# Patient Record
Sex: FEMALE | Race: WHITE | Employment: FULL TIME
[De-identification: names, ages, dates, MRNs, and addresses within clinical notes are randomized per-mention and may not be internally consistent; named-entity substitution may affect disease eponyms.]

---

## 2023-11-19 ENCOUNTER — APPOINTMENT (OUTPATIENT)
Facility: HOSPITAL | Age: 53
DRG: 286 | End: 2023-11-19
Payer: COMMERCIAL

## 2023-11-19 ENCOUNTER — HOSPITAL ENCOUNTER (INPATIENT)
Facility: HOSPITAL | Age: 53
LOS: 1 days | Discharge: HOME | DRG: 286 | End: 2023-11-20
Attending: EMERGENCY MEDICINE | Admitting: INTERNAL MEDICINE
Payer: COMMERCIAL

## 2023-11-19 DIAGNOSIS — I21.3 ST ELEVATION MYOCARDIAL INFARCTION (STEMI), UNSPECIFIED ARTERY (HCC): Primary | ICD-10-CM

## 2023-11-19 DIAGNOSIS — I21.3 STEMI (ST ELEVATION MYOCARDIAL INFARCTION) (HCC): ICD-10-CM

## 2023-11-19 PROBLEM — F31.81 BIPOLAR 2 DISORDER (HCC): Status: ACTIVE | Noted: 2023-11-19

## 2023-11-19 PROBLEM — R07.9 CHEST PAIN: Status: ACTIVE | Noted: 2023-11-19

## 2023-11-19 PROBLEM — E87.6 HYPOKALEMIA: Status: ACTIVE | Noted: 2023-11-19

## 2023-11-19 LAB
ALBUMIN SERPL-MCNC: 3.9 G/DL (ref 3.5–5)
ALBUMIN/GLOB SERPL: 1 (ref 1.1–2.2)
ALP SERPL-CCNC: 72 U/L (ref 45–117)
ALT SERPL-CCNC: 37 U/L (ref 12–78)
AMPHET UR QL SCN: NEGATIVE
ANION GAP SERPL CALC-SCNC: 10 MMOL/L (ref 5–15)
APPEARANCE UR: CLEAR
APTT PPP: 26.4 SEC (ref 22.1–31)
AST SERPL-CCNC: 19 U/L (ref 15–37)
B PERT DNA SPEC QL NAA+PROBE: NOT DETECTED
BARBITURATES UR QL SCN: NEGATIVE
BENZODIAZ UR QL: POSITIVE
BILIRUB SERPL-MCNC: 0.5 MG/DL (ref 0.2–1)
BILIRUB UR QL: NEGATIVE
BORDETELLA PARAPERTUSSIS BY PCR: NOT DETECTED
BUN SERPL-MCNC: 11 MG/DL (ref 6–20)
BUN/CREAT SERPL: 11 (ref 12–20)
C PNEUM DNA SPEC QL NAA+PROBE: NOT DETECTED
CALCIUM SERPL-MCNC: 9.8 MG/DL (ref 8.5–10.1)
CANNABINOIDS UR QL SCN: NEGATIVE
CHLORIDE SERPL-SCNC: 104 MMOL/L (ref 97–108)
CHOLEST SERPL-MCNC: 244 MG/DL
CO2 SERPL-SCNC: 23 MMOL/L (ref 21–32)
COCAINE UR QL SCN: NEGATIVE
COLOR UR: NORMAL
COMMENT:: NORMAL
CREAT SERPL-MCNC: 1.02 MG/DL (ref 0.55–1.02)
D DIMER PPP FEU-MCNC: 0.38 MG/L FEU (ref 0–0.65)
ECHO BSA: 1.75 M2
EST. AVERAGE GLUCOSE BLD GHB EST-MCNC: 100 MG/DL
ETHANOL SERPL-MCNC: <10 MG/DL (ref 0–0.08)
FLUAV SUBTYP SPEC NAA+PROBE: NOT DETECTED
FLUBV RNA SPEC QL NAA+PROBE: NOT DETECTED
GLOBULIN SER CALC-MCNC: 3.9 G/DL (ref 2–4)
GLUCOSE SERPL-MCNC: 181 MG/DL (ref 65–100)
GLUCOSE UR STRIP.AUTO-MCNC: NEGATIVE MG/DL
HADV DNA SPEC QL NAA+PROBE: NOT DETECTED
HBA1C MFR BLD: 5.1 % (ref 4–5.6)
HCOV 229E RNA SPEC QL NAA+PROBE: NOT DETECTED
HCOV HKU1 RNA SPEC QL NAA+PROBE: NOT DETECTED
HCOV NL63 RNA SPEC QL NAA+PROBE: NOT DETECTED
HCOV OC43 RNA SPEC QL NAA+PROBE: NOT DETECTED
HDLC SERPL-MCNC: 76 MG/DL
HDLC SERPL: 3.2 (ref 0–5)
HGB UR QL STRIP: NEGATIVE
HMPV RNA SPEC QL NAA+PROBE: NOT DETECTED
HPIV1 RNA SPEC QL NAA+PROBE: NOT DETECTED
HPIV2 RNA SPEC QL NAA+PROBE: NOT DETECTED
HPIV3 RNA SPEC QL NAA+PROBE: NOT DETECTED
HPIV4 RNA SPEC QL NAA+PROBE: NOT DETECTED
INR PPP: 1 (ref 0.9–1.1)
KETONES UR QL STRIP.AUTO: NEGATIVE MG/DL
LDLC SERPL CALC-MCNC: 158.2 MG/DL (ref 0–100)
LEUKOCYTE ESTERASE UR QL STRIP.AUTO: NEGATIVE
Lab: ABNORMAL
M PNEUMO DNA SPEC QL NAA+PROBE: NOT DETECTED
METHADONE UR QL: NEGATIVE
NITRITE UR QL STRIP.AUTO: NEGATIVE
NT PRO BNP: 28 PG/ML
OPIATES UR QL: NEGATIVE
PCP UR QL: NEGATIVE
PH UR STRIP: 8 (ref 5–8)
POTASSIUM SERPL-SCNC: 3.3 MMOL/L (ref 3.5–5.1)
PROCALCITONIN SERPL-MCNC: <0.05 NG/ML
PROT SERPL-MCNC: 7.8 G/DL (ref 6.4–8.2)
PROT UR STRIP-MCNC: NEGATIVE MG/DL
PROTHROMBIN TIME: 10.3 SEC (ref 9–11.1)
RSV RNA SPEC QL NAA+PROBE: NOT DETECTED
RV+EV RNA SPEC QL NAA+PROBE: NOT DETECTED
SARS-COV-2 RNA RESP QL NAA+PROBE: NOT DETECTED
SODIUM SERPL-SCNC: 137 MMOL/L (ref 136–145)
SP GR UR REFRACTOMETRY: 1.03 (ref 1–1.03)
SPECIMEN HOLD: NORMAL
THERAPEUTIC RANGE: NORMAL SECS (ref 58–77)
TRIGL SERPL-MCNC: 49 MG/DL
TROPONIN I SERPL HS-MCNC: 27 NG/L (ref 0–51)
UFH PPP CHRO-ACNC: <0.1 IU/ML
UROBILINOGEN UR QL STRIP.AUTO: 0.2 EU/DL (ref 0.2–1)
VLDLC SERPL CALC-MCNC: 9.8 MG/DL

## 2023-11-19 PROCEDURE — 6360000002 HC RX W HCPCS: Performed by: INTERNAL MEDICINE

## 2023-11-19 PROCEDURE — 2580000003 HC RX 258: Performed by: INTERNAL MEDICINE

## 2023-11-19 PROCEDURE — 71275 CT ANGIOGRAPHY CHEST: CPT

## 2023-11-19 PROCEDURE — 80061 LIPID PANEL: CPT

## 2023-11-19 PROCEDURE — 96374 THER/PROPH/DIAG INJ IV PUSH: CPT

## 2023-11-19 PROCEDURE — 80307 DRUG TEST PRSMV CHEM ANLYZR: CPT

## 2023-11-19 PROCEDURE — 99285 EMERGENCY DEPT VISIT HI MDM: CPT

## 2023-11-19 PROCEDURE — 99152 MOD SED SAME PHYS/QHP 5/>YRS: CPT | Performed by: INTERNAL MEDICINE

## 2023-11-19 PROCEDURE — 6360000002 HC RX W HCPCS: Performed by: EMERGENCY MEDICINE

## 2023-11-19 PROCEDURE — 83880 ASSAY OF NATRIURETIC PEPTIDE: CPT

## 2023-11-19 PROCEDURE — 4A023N7 MEASUREMENT OF CARDIAC SAMPLING AND PRESSURE, LEFT HEART, PERCUTANEOUS APPROACH: ICD-10-PCS | Performed by: INTERNAL MEDICINE

## 2023-11-19 PROCEDURE — 82077 ASSAY SPEC XCP UR&BREATH IA: CPT

## 2023-11-19 PROCEDURE — 85610 PROTHROMBIN TIME: CPT

## 2023-11-19 PROCEDURE — 36415 COLL VENOUS BLD VENIPUNCTURE: CPT

## 2023-11-19 PROCEDURE — 71045 X-RAY EXAM CHEST 1 VIEW: CPT

## 2023-11-19 PROCEDURE — 93005 ELECTROCARDIOGRAM TRACING: CPT | Performed by: INTERNAL MEDICINE

## 2023-11-19 PROCEDURE — 2500000003 HC RX 250 WO HCPCS: Performed by: INTERNAL MEDICINE

## 2023-11-19 PROCEDURE — 85520 HEPARIN ASSAY: CPT

## 2023-11-19 PROCEDURE — 6360000004 HC RX CONTRAST MEDICATION: Performed by: INTERNAL MEDICINE

## 2023-11-19 PROCEDURE — 84145 PROCALCITONIN (PCT): CPT

## 2023-11-19 PROCEDURE — C1887 CATHETER, GUIDING: HCPCS | Performed by: INTERNAL MEDICINE

## 2023-11-19 PROCEDURE — 6360000004 HC RX CONTRAST MEDICATION: Performed by: EMERGENCY MEDICINE

## 2023-11-19 PROCEDURE — 93458 L HRT ARTERY/VENTRICLE ANGIO: CPT | Performed by: INTERNAL MEDICINE

## 2023-11-19 PROCEDURE — 85730 THROMBOPLASTIN TIME PARTIAL: CPT

## 2023-11-19 PROCEDURE — 2709999900 HC NON-CHARGEABLE SUPPLY: Performed by: INTERNAL MEDICINE

## 2023-11-19 PROCEDURE — 2000000000 HC ICU R&B

## 2023-11-19 PROCEDURE — 87086 URINE CULTURE/COLONY COUNT: CPT

## 2023-11-19 PROCEDURE — 85379 FIBRIN DEGRADATION QUANT: CPT

## 2023-11-19 PROCEDURE — 83036 HEMOGLOBIN GLYCOSYLATED A1C: CPT

## 2023-11-19 PROCEDURE — C1894 INTRO/SHEATH, NON-LASER: HCPCS | Performed by: INTERNAL MEDICINE

## 2023-11-19 PROCEDURE — 84484 ASSAY OF TROPONIN QUANT: CPT

## 2023-11-19 PROCEDURE — 80053 COMPREHEN METABOLIC PANEL: CPT

## 2023-11-19 PROCEDURE — 81002 URINALYSIS NONAUTO W/O SCOPE: CPT

## 2023-11-19 PROCEDURE — C1769 GUIDE WIRE: HCPCS | Performed by: INTERNAL MEDICINE

## 2023-11-19 PROCEDURE — 99223 1ST HOSP IP/OBS HIGH 75: CPT | Performed by: SPECIALIST

## 2023-11-19 PROCEDURE — B2111ZZ FLUOROSCOPY OF MULTIPLE CORONARY ARTERIES USING LOW OSMOLAR CONTRAST: ICD-10-PCS | Performed by: INTERNAL MEDICINE

## 2023-11-19 PROCEDURE — 0202U NFCT DS 22 TRGT SARS-COV-2: CPT

## 2023-11-19 PROCEDURE — 6370000000 HC RX 637 (ALT 250 FOR IP): Performed by: INTERNAL MEDICINE

## 2023-11-19 RX ORDER — SODIUM CHLORIDE 9 MG/ML
INJECTION, SOLUTION INTRAVENOUS CONTINUOUS PRN
Status: COMPLETED | OUTPATIENT
Start: 2023-11-19 | End: 2023-11-19

## 2023-11-19 RX ORDER — FENTANYL CITRATE 50 UG/ML
INJECTION, SOLUTION INTRAMUSCULAR; INTRAVENOUS PRN
Status: DISCONTINUED | OUTPATIENT
Start: 2023-11-19 | End: 2023-11-19 | Stop reason: HOSPADM

## 2023-11-19 RX ORDER — ASPIRIN 81 MG/1
81 TABLET, CHEWABLE ORAL DAILY
Status: DISCONTINUED | OUTPATIENT
Start: 2023-11-19 | End: 2023-11-20 | Stop reason: HOSPADM

## 2023-11-19 RX ORDER — LAMOTRIGINE 100 MG/1
200 TABLET ORAL DAILY
COMMUNITY

## 2023-11-19 RX ORDER — LAMOTRIGINE 100 MG/1
200 TABLET ORAL DAILY
Status: DISCONTINUED | OUTPATIENT
Start: 2023-11-19 | End: 2023-11-19

## 2023-11-19 RX ORDER — HEPARIN SODIUM 1000 [USP'U]/ML
60 INJECTION, SOLUTION INTRAVENOUS; SUBCUTANEOUS PRN
Status: DISCONTINUED | OUTPATIENT
Start: 2023-11-19 | End: 2023-11-19

## 2023-11-19 RX ORDER — VENLAFAXINE HYDROCHLORIDE 150 MG/1
150 CAPSULE, EXTENDED RELEASE ORAL
Status: DISCONTINUED | OUTPATIENT
Start: 2023-11-20 | End: 2023-11-20 | Stop reason: HOSPADM

## 2023-11-19 RX ORDER — LAMOTRIGINE 200 MG/1
200 TABLET, ORALLY DISINTEGRATING ORAL DAILY
Status: DISCONTINUED | OUTPATIENT
Start: 2023-11-19 | End: 2023-11-20 | Stop reason: HOSPADM

## 2023-11-19 RX ORDER — LORAZEPAM 2 MG/ML
1 INJECTION INTRAMUSCULAR ONCE
Status: DISCONTINUED | OUTPATIENT
Start: 2023-11-19 | End: 2023-11-20 | Stop reason: HOSPADM

## 2023-11-19 RX ORDER — PANTOPRAZOLE SODIUM 40 MG/1
40 TABLET, DELAYED RELEASE ORAL
Status: DISCONTINUED | OUTPATIENT
Start: 2023-11-20 | End: 2023-11-20 | Stop reason: HOSPADM

## 2023-11-19 RX ORDER — HEPARIN SODIUM 200 [USP'U]/100ML
INJECTION, SOLUTION INTRAVENOUS PRN
Status: DISCONTINUED | OUTPATIENT
Start: 2023-11-19 | End: 2023-11-19 | Stop reason: HOSPADM

## 2023-11-19 RX ORDER — ATORVASTATIN CALCIUM 20 MG/1
40 TABLET, FILM COATED ORAL NIGHTLY
Status: DISCONTINUED | OUTPATIENT
Start: 2023-11-19 | End: 2023-11-20 | Stop reason: HOSPADM

## 2023-11-19 RX ORDER — HEPARIN SODIUM 1000 [USP'U]/ML
30 INJECTION, SOLUTION INTRAVENOUS; SUBCUTANEOUS PRN
Status: DISCONTINUED | OUTPATIENT
Start: 2023-11-19 | End: 2023-11-19

## 2023-11-19 RX ORDER — HEPARIN SODIUM 10000 [USP'U]/100ML
5-30 INJECTION, SOLUTION INTRAVENOUS CONTINUOUS
Status: DISCONTINUED | OUTPATIENT
Start: 2023-11-19 | End: 2023-11-19

## 2023-11-19 RX ORDER — LIDOCAINE HYDROCHLORIDE 10 MG/ML
INJECTION, SOLUTION INFILTRATION; PERINEURAL PRN
Status: DISCONTINUED | OUTPATIENT
Start: 2023-11-19 | End: 2023-11-19 | Stop reason: HOSPADM

## 2023-11-19 RX ORDER — TRAZODONE HYDROCHLORIDE 50 MG/1
150 TABLET ORAL NIGHTLY
COMMUNITY

## 2023-11-19 RX ORDER — SODIUM CHLORIDE 0.9 % (FLUSH) 0.9 %
5-40 SYRINGE (ML) INJECTION EVERY 12 HOURS SCHEDULED
Status: DISCONTINUED | OUTPATIENT
Start: 2023-11-19 | End: 2023-11-20 | Stop reason: HOSPADM

## 2023-11-19 RX ORDER — SODIUM CHLORIDE 0.9 % (FLUSH) 0.9 %
5-40 SYRINGE (ML) INJECTION PRN
Status: DISCONTINUED | OUTPATIENT
Start: 2023-11-19 | End: 2023-11-20 | Stop reason: HOSPADM

## 2023-11-19 RX ORDER — CLOPIDOGREL BISULFATE 75 MG/1
75 TABLET ORAL DAILY
Status: DISCONTINUED | OUTPATIENT
Start: 2023-11-19 | End: 2023-11-20 | Stop reason: HOSPADM

## 2023-11-19 RX ORDER — SODIUM CHLORIDE 9 MG/ML
INJECTION, SOLUTION INTRAVENOUS PRN
Status: DISCONTINUED | OUTPATIENT
Start: 2023-11-19 | End: 2023-11-20 | Stop reason: HOSPADM

## 2023-11-19 RX ORDER — TRAZODONE HYDROCHLORIDE 50 MG/1
150 TABLET ORAL NIGHTLY
Status: DISCONTINUED | OUTPATIENT
Start: 2023-11-19 | End: 2023-11-20 | Stop reason: HOSPADM

## 2023-11-19 RX ORDER — SODIUM CHLORIDE 9 MG/ML
INJECTION, SOLUTION INTRAVENOUS CONTINUOUS
Status: DISCONTINUED | OUTPATIENT
Start: 2023-11-19 | End: 2023-11-20

## 2023-11-19 RX ORDER — HEPARIN SODIUM 1000 [USP'U]/ML
4000 INJECTION, SOLUTION INTRAVENOUS; SUBCUTANEOUS ONCE
Status: COMPLETED | OUTPATIENT
Start: 2023-11-19 | End: 2023-11-19

## 2023-11-19 RX ORDER — HEPARIN SODIUM 1000 [USP'U]/ML
INJECTION, SOLUTION INTRAVENOUS; SUBCUTANEOUS PRN
Status: DISCONTINUED | OUTPATIENT
Start: 2023-11-19 | End: 2023-11-19 | Stop reason: HOSPADM

## 2023-11-19 RX ORDER — MIDAZOLAM HYDROCHLORIDE 1 MG/ML
INJECTION INTRAMUSCULAR; INTRAVENOUS PRN
Status: DISCONTINUED | OUTPATIENT
Start: 2023-11-19 | End: 2023-11-19 | Stop reason: HOSPADM

## 2023-11-19 RX ADMIN — SODIUM CHLORIDE: 9 INJECTION, SOLUTION INTRAVENOUS at 16:30

## 2023-11-19 RX ADMIN — CLOPIDOGREL BISULFATE 75 MG: 75 TABLET ORAL at 18:08

## 2023-11-19 RX ADMIN — POTASSIUM BICARBONATE 20 MEQ: 782 TABLET, EFFERVESCENT ORAL at 18:08

## 2023-11-19 RX ADMIN — SODIUM CHLORIDE, PRESERVATIVE FREE 10 ML: 5 INJECTION INTRAVENOUS at 20:11

## 2023-11-19 RX ADMIN — TRAZODONE HYDROCHLORIDE 150 MG: 50 TABLET ORAL at 20:11

## 2023-11-19 RX ADMIN — HEPARIN SODIUM 4000 UNITS: 1000 INJECTION INTRAVENOUS; SUBCUTANEOUS at 13:39

## 2023-11-19 RX ADMIN — IOPAMIDOL 100 ML: 755 INJECTION, SOLUTION INTRAVENOUS at 13:55

## 2023-11-19 RX ADMIN — LAMOTRIGINE 200 MG: 200 TABLET, ORALLY DISINTEGRATING ORAL at 20:11

## 2023-11-19 RX ADMIN — HEPARIN SODIUM AND DEXTROSE 12 UNITS/KG/HR: 10000; 5 INJECTION INTRAVENOUS at 13:39

## 2023-11-19 RX ADMIN — ASPIRIN 81 MG: 81 TABLET, CHEWABLE ORAL at 18:08

## 2023-11-19 ASSESSMENT — ENCOUNTER SYMPTOMS
SORE THROAT: 0
CHEST TIGHTNESS: 1
ABDOMINAL PAIN: 0
EYE PAIN: 0
BACK PAIN: 0
VOMITING: 0
SHORTNESS OF BREATH: 0

## 2023-11-19 ASSESSMENT — PAIN - FUNCTIONAL ASSESSMENT: PAIN_FUNCTIONAL_ASSESSMENT: NONE - DENIES PAIN

## 2023-11-19 NOTE — ED TRIAGE NOTES
Patient to ER via EMS for complains of sudden chest pain 1 hour ago that radiates to the left jaw and left arm. EMS called STEMI in report. EMS reports elevation in all leads. EMS reports last to EKG show NSR. Patient is alert and oriented x 4. Patient had recent hip sx. EMS administered 1 nitro, 324 ASA, and fentanyl.

## 2023-11-19 NOTE — CONSULTS
[address] [place] [address]    Hospitalist Consult Note      NAME:  Shawn Kim   :   1970   MRN:  [id number]     Requesting Physician Ga Kellogg MD   Reason for Consult:  Medical management   To assist coordination of care and communication with nursing and staff, this note may be preliminary early in the day, but finalized by end of the day. PCP:  Gema Schmidt MD     Date/Time of service  2023 4:04 PM       Assessment and Plan:      Chest pain - POA, unclear etiology, likely GERD. Cath ruled out STEMI (ST elevation myocardial infarction). CTA ruled out PE. Vitals and labs stable. Cardiology added ASA, statin. She has recently been on Mobic for arthritis, but had run out of PPI. I will add PPI    Hypokalemia - Replete and check in AM      Bipolar 2 disorder - Resume Effexor, lamotrigine and trazodone     Medically she appears stable to discharge at discretion of cardiology        Subjective:     CHIEF COMPLAINT: chest pain     HISTORY OF PRESENT ILLNESS:     Ms. Anthony Ivan is a 47 y.o.  female who is admitted to the cardiology Service with CP. We are asked to evaluate for medical management. CP today. EKG and troponin negative. Cath negative. She has not active medical issues. We were asked to consult for medical management. .      Past Medical History:   Diagnosis Date    Bipolar 2 disorder ([address])         No past surgical history on file. Social History     Tobacco Use    Smoking status: Not on file    Smokeless tobacco: Not on file   Substance Use Topics    Alcohol use: Not on file        No family history on file.      Allergies   Allergen Reactions    Morphine Hives    Percocet [Oxycodone-Acetaminophen] Hives        Prior to Admission medications    Not on File         Current Facility-Administered Medications:     LORazepam (ATIVAN) injection 1 mg, 1 mg, IntraVENous, Once, Sahil Villa MD    0.9 % sodium chloride infusion, , IntraVENous, Continuous, Ninfa Robles,

## 2023-11-19 NOTE — ED PROVIDER NOTES
[facility] EMERGENCY DEPT  EMERGENCY DEPARTMENT ENCOUNTER      Pt Name: Kaleigh De La Garza  MRN: [id number]  [address] [place] 1970  Date of evaluation: 11/19/2023  Provider: Kiesha Moon MD      HISTORY OF PRESENT ILLNESS      50-year-old female presenting to the ER for chest pain. Patient reports she developed substernal chest pain radiating to her jaw and left shoulder about 1 hour ago while she was in her kitchen doing some art. She does not have a previous history of MI or known heart disease. Does report that she has a family history of coronary disease. She notably is recently postoperative from a right hip labrum repair surgery on November 6. She does not report any significant shortness of breath. EMS was called, they diagnosed her with a STEMI in leads V1, V2. They gave her chewable aspirin, fentanyl and nitroglycerin in route. She states that upon arrival to the ER she is not pain-free but very anxious. Nursing Notes were reviewed. REVIEW OF SYSTEMS         Review of Systems   Constitutional:  Negative for chills and fever. HENT:  Negative for congestion and sore throat. Eyes:  Negative for pain and visual disturbance. Respiratory:  Positive for chest tightness. Negative for shortness of breath. Cardiovascular:  Positive for chest pain. Negative for leg swelling. Gastrointestinal:  Negative for abdominal pain and vomiting. Genitourinary:  Negative for dysuria. Musculoskeletal:  Negative for back pain. Skin:  Negative for rash. Neurological:  Negative for weakness and headaches. Psychiatric/Behavioral:  The patient is nervous/anxious. All other systems reviewed and are negative. PAST MEDICAL HISTORY   No past medical history on file. SURGICAL HISTORY     No past surgical history on file.       CURRENT MEDICATIONS       Previous Medications    No medications on file       ALLERGIES     Morphine and Percocet [oxycodone-acetaminophen]    FAMILY HISTORY

## 2023-11-19 NOTE — CONSULTS
[facility]  Cardiology Care Note                  [x]Initial visit     []Established visit     Shin Mosley MD, [facility], [address], [place]  Phone [phone]; Fax [phone]  Mobile [phone]   Voice Mail [phone]      Patient Name: Gokul Beaver - [facility]:[id number]  Primary Cardiologist: none  Consulting Cardiologist: [facility] DAWN Mosley MD       11/19/2023  1:10 PM    Reason for initial visit:Sudden onset of chest pain     HPI:     13 days ago had hip surgery for Femoroacetabular impingement of right hip    Tear of right acetabular labrum. She has been doing well until today. She says she was at the counter in the kitchen doing a paint by number project and sat there for about an hour and a half and then got up and was not feeling well and then started to develop discomfort in her belly. She does have a compression device around her abdomen. She went up to her bedroom and was sitting straight upright and then suddenly felt discomfort in her chest with radiation to her left jaw and her left arm. EKG demonstrated some minimal ST elevation in the septal leads. She swims on a regular basis prior to her having her hip issues. She has not had any cardiac history no chest pain or shortness of breath and states her cholesterol is normal.       Cardiac risk factors: family history of premature cardiovascular disease. Assessment  and  Plan     1)Chest discomfort  -CT of her chest indicated no evidence of a pulmonary embolus  -Troponins are still pending  -ECG minimal ST elevation in the septal leads  -Quick look echo  that I performed demonstrated septal hypokinesis  -Discussed with Dr. Lexie Vallejo on approximately 2 occasions the findings and he will proceed with cardiac catheterization. -Aspirin 81 mg    Discussion and echo images took approximately 45 min.

## 2023-11-20 ENCOUNTER — APPOINTMENT (OUTPATIENT)
Facility: HOSPITAL | Age: 53
DRG: 286 | End: 2023-11-20
Payer: COMMERCIAL

## 2023-11-20 VITALS
RESPIRATION RATE: 17 BRPM | DIASTOLIC BLOOD PRESSURE: 78 MMHG | HEIGHT: 66 IN | WEIGHT: 145 LBS | TEMPERATURE: 98.1 F | HEART RATE: 72 BPM | SYSTOLIC BLOOD PRESSURE: 118 MMHG | OXYGEN SATURATION: 97 % | BODY MASS INDEX: 23.3 KG/M2

## 2023-11-20 LAB
ALBUMIN SERPL-MCNC: 2.9 G/DL (ref 3.5–5)
ALBUMIN/GLOB SERPL: 1 (ref 1.1–2.2)
ALP SERPL-CCNC: 55 U/L (ref 45–117)
ALT SERPL-CCNC: 27 U/L (ref 12–78)
ANION GAP SERPL CALC-SCNC: 2 MMOL/L (ref 5–15)
AST SERPL-CCNC: 18 U/L (ref 15–37)
BACTERIA SPEC CULT: NORMAL
BILIRUB SERPL-MCNC: 0.4 MG/DL (ref 0.2–1)
BUN SERPL-MCNC: 8 MG/DL (ref 6–20)
BUN/CREAT SERPL: 10 (ref 12–20)
CALCIUM SERPL-MCNC: 7.9 MG/DL (ref 8.5–10.1)
CHLORIDE SERPL-SCNC: 111 MMOL/L (ref 97–108)
CO2 SERPL-SCNC: 28 MMOL/L (ref 21–32)
CREAT SERPL-MCNC: 0.8 MG/DL (ref 0.55–1.02)
ECHO AV AREA PEAK VELOCITY: 1.9 CM2
ECHO AV AREA VTI: 1.7 CM2
ECHO AV AREA/BSA PEAK VELOCITY: 1.1 CM2/M2
ECHO AV AREA/BSA VTI: 1 CM2/M2
ECHO AV MEAN GRADIENT: 4 MMHG
ECHO AV MEAN VELOCITY: 0.9 M/S
ECHO AV PEAK GRADIENT: 7 MMHG
ECHO AV PEAK VELOCITY: 1.3 M/S
ECHO AV VELOCITY RATIO: 0.69
ECHO AV VTI: 26.9 CM
ECHO BSA: 1.75 M2
ECHO LA DIAMETER INDEX: 1.55 CM/M2
ECHO LA DIAMETER: 2.7 CM
ECHO LA VOL A-L A4C: 31 ML (ref 22–52)
ECHO LA VOL MOD A4C: 30 ML (ref 22–52)
ECHO LA VOLUME INDEX A-L A4C: 18 ML/M2 (ref 16–34)
ECHO LA VOLUME INDEX MOD A4C: 17 ML/M2 (ref 16–34)
ECHO LV E' LATERAL VELOCITY: 9 CM/S
ECHO LV E' SEPTAL VELOCITY: 8 CM/S
ECHO LV EDV A4C: 91 ML
ECHO LV EDV INDEX A4C: 52 ML/M2
ECHO LV EJECTION FRACTION A4C: 54 %
ECHO LV ESV A4C: 42 ML
ECHO LV ESV INDEX A4C: 24 ML/M2
ECHO LV FRACTIONAL SHORTENING: 33 % (ref 28–44)
ECHO LV INTERNAL DIMENSION DIASTOLE INDEX: 2.3 CM/M2
ECHO LV INTERNAL DIMENSION DIASTOLIC: 4 CM (ref 3.9–5.3)
ECHO LV INTERNAL DIMENSION SYSTOLIC INDEX: 1.55 CM/M2
ECHO LV INTERNAL DIMENSION SYSTOLIC: 2.7 CM
ECHO LV IVSD: 0.9 CM (ref 0.6–0.9)
ECHO LV MASS 2D: 127.1 G (ref 67–162)
ECHO LV MASS INDEX 2D: 73 G/M2 (ref 43–95)
ECHO LV POSTERIOR WALL DIASTOLIC: 1.1 CM (ref 0.6–0.9)
ECHO LV RELATIVE WALL THICKNESS RATIO: 0.55
ECHO LVOT AREA: 2.8 CM2
ECHO LVOT AV VTI INDEX: 0.62
ECHO LVOT DIAM: 1.9 CM
ECHO LVOT MEAN GRADIENT: 2 MMHG
ECHO LVOT PEAK GRADIENT: 3 MMHG
ECHO LVOT PEAK VELOCITY: 0.9 M/S
ECHO LVOT STROKE VOLUME INDEX: 27.4 ML/M2
ECHO LVOT SV: 47.6 ML
ECHO LVOT VTI: 16.8 CM
ECHO MV A VELOCITY: 0.89 M/S
ECHO MV AREA VTI: 2 CM2
ECHO MV E DECELERATION TIME (DT): 240.1 MS
ECHO MV E VELOCITY: 0.78 M/S
ECHO MV E/A RATIO: 0.88
ECHO MV E/E' LATERAL: 8.67
ECHO MV E/E' RATIO (AVERAGED): 9.21
ECHO MV LVOT VTI INDEX: 1.43
ECHO MV MAX VELOCITY: 1 M/S
ECHO MV MEAN GRADIENT: 2 MMHG
ECHO MV MEAN VELOCITY: 0.7 M/S
ECHO MV PEAK GRADIENT: 4 MMHG
ECHO MV VTI: 24 CM
ECHO PV MAX VELOCITY: 0.8 M/S
ECHO PV PEAK GRADIENT: 3 MMHG
EKG ATRIAL RATE: 77 BPM
EKG ATRIAL RATE: 83 BPM
EKG DIAGNOSIS: NORMAL
EKG DIAGNOSIS: NORMAL
EKG P AXIS: 47 DEGREES
EKG P AXIS: 66 DEGREES
EKG P-R INTERVAL: 150 MS
EKG P-R INTERVAL: 160 MS
EKG Q-T INTERVAL: 390 MS
EKG Q-T INTERVAL: 392 MS
EKG QRS DURATION: 74 MS
EKG QRS DURATION: 82 MS
EKG QTC CALCULATION (BAZETT): 441 MS
EKG QTC CALCULATION (BAZETT): 460 MS
EKG R AXIS: 23 DEGREES
EKG R AXIS: 48 DEGREES
EKG T AXIS: 54 DEGREES
EKG T AXIS: 62 DEGREES
EKG VENTRICULAR RATE: 77 BPM
EKG VENTRICULAR RATE: 83 BPM
ERYTHROCYTE [DISTWIDTH] IN BLOOD BY AUTOMATED COUNT: 12.5 % (ref 11.5–14.5)
GLOBULIN SER CALC-MCNC: 2.9 G/DL (ref 2–4)
GLUCOSE SERPL-MCNC: 91 MG/DL (ref 65–100)
HCT VFR BLD AUTO: 34.6 % (ref 35–47)
HGB BLD-MCNC: 11.4 G/DL (ref 11.5–16)
MAGNESIUM SERPL-MCNC: 1.8 MG/DL (ref 1.6–2.4)
MCH RBC QN AUTO: 28.9 PG (ref 26–34)
MCHC RBC AUTO-ENTMCNC: 32.9 G/DL (ref 30–36.5)
MCV RBC AUTO: 87.8 FL (ref 80–99)
NRBC # BLD: 0 K/UL (ref 0–0.01)
NRBC BLD-RTO: 0 PER 100 WBC
PHOSPHATE SERPL-MCNC: 3.7 MG/DL (ref 2.6–4.7)
PLATELET # BLD AUTO: 260 K/UL (ref 150–400)
PMV BLD AUTO: 9.6 FL (ref 8.9–12.9)
POTASSIUM SERPL-SCNC: 3.7 MMOL/L (ref 3.5–5.1)
PROT SERPL-MCNC: 5.8 G/DL (ref 6.4–8.2)
RBC # BLD AUTO: 3.94 M/UL (ref 3.8–5.2)
SERVICE CMNT-IMP: NORMAL
SODIUM SERPL-SCNC: 141 MMOL/L (ref 136–145)
TROPONIN I SERPL HS-MCNC: 1379 NG/L (ref 0–51)
WBC # BLD AUTO: 7.2 K/UL (ref 3.6–11)

## 2023-11-20 PROCEDURE — C8929 TTE W OR WO FOL WCON,DOPPLER: HCPCS

## 2023-11-20 PROCEDURE — 80053 COMPREHEN METABOLIC PANEL: CPT

## 2023-11-20 PROCEDURE — 97161 PT EVAL LOW COMPLEX 20 MIN: CPT

## 2023-11-20 PROCEDURE — 36415 COLL VENOUS BLD VENIPUNCTURE: CPT

## 2023-11-20 PROCEDURE — 97530 THERAPEUTIC ACTIVITIES: CPT

## 2023-11-20 PROCEDURE — 85027 COMPLETE CBC AUTOMATED: CPT

## 2023-11-20 PROCEDURE — 84100 ASSAY OF PHOSPHORUS: CPT

## 2023-11-20 PROCEDURE — 93010 ELECTROCARDIOGRAM REPORT: CPT | Performed by: SPECIALIST

## 2023-11-20 PROCEDURE — 6370000000 HC RX 637 (ALT 250 FOR IP): Performed by: INTERNAL MEDICINE

## 2023-11-20 PROCEDURE — 83735 ASSAY OF MAGNESIUM: CPT

## 2023-11-20 PROCEDURE — 6360000004 HC RX CONTRAST MEDICATION: Performed by: INTERNAL MEDICINE

## 2023-11-20 PROCEDURE — 2580000003 HC RX 258: Performed by: INTERNAL MEDICINE

## 2023-11-20 PROCEDURE — 99232 SBSQ HOSP IP/OBS MODERATE 35: CPT | Performed by: INTERNAL MEDICINE

## 2023-11-20 PROCEDURE — APPSS30 APP SPLIT SHARED TIME 16-30 MINUTES: Performed by: NURSE PRACTITIONER

## 2023-11-20 PROCEDURE — 93306 TTE W/DOPPLER COMPLETE: CPT | Performed by: STUDENT IN AN ORGANIZED HEALTH CARE EDUCATION/TRAINING PROGRAM

## 2023-11-20 PROCEDURE — 84484 ASSAY OF TROPONIN QUANT: CPT

## 2023-11-20 RX ORDER — VENLAFAXINE HYDROCHLORIDE 150 MG/1
150 CAPSULE, EXTENDED RELEASE ORAL
Qty: 30 CAPSULE | Refills: 3 | Status: SHIPPED
Start: 2023-11-21

## 2023-11-20 RX ORDER — PANTOPRAZOLE SODIUM 40 MG/1
40 TABLET, DELAYED RELEASE ORAL
Qty: 30 TABLET | Refills: 3 | Status: SHIPPED | OUTPATIENT
Start: 2023-11-21

## 2023-11-20 RX ORDER — CLOPIDOGREL BISULFATE 75 MG/1
75 TABLET ORAL DAILY
Qty: 30 TABLET | Refills: 3 | Status: SHIPPED | OUTPATIENT
Start: 2023-11-20

## 2023-11-20 RX ORDER — ASPIRIN 81 MG/1
81 TABLET, CHEWABLE ORAL DAILY
Qty: 30 TABLET | Refills: 3 | Status: SHIPPED | OUTPATIENT
Start: 2023-11-20

## 2023-11-20 RX ORDER — ATORVASTATIN CALCIUM 40 MG/1
40 TABLET, FILM COATED ORAL NIGHTLY
Qty: 30 TABLET | Refills: 3 | Status: SHIPPED | OUTPATIENT
Start: 2023-11-20

## 2023-11-20 RX ORDER — ENOXAPARIN SODIUM 100 MG/ML
40 INJECTION SUBCUTANEOUS EVERY 24 HOURS
Status: DISCONTINUED | OUTPATIENT
Start: 2023-11-20 | End: 2023-11-20 | Stop reason: HOSPADM

## 2023-11-20 RX ORDER — ACETAMINOPHEN 325 MG/1
650 TABLET ORAL EVERY 4 HOURS PRN
Status: DISCONTINUED | OUTPATIENT
Start: 2023-11-20 | End: 2023-11-20 | Stop reason: HOSPADM

## 2023-11-20 RX ADMIN — PANTOPRAZOLE SODIUM 40 MG: 40 TABLET, DELAYED RELEASE ORAL at 07:36

## 2023-11-20 RX ADMIN — VENLAFAXINE HYDROCHLORIDE 150 MG: 150 CAPSULE, EXTENDED RELEASE ORAL at 07:37

## 2023-11-20 RX ADMIN — ACETAMINOPHEN 650 MG: 325 TABLET ORAL at 02:15

## 2023-11-20 RX ADMIN — SODIUM CHLORIDE, PRESERVATIVE FREE 10 ML: 5 INJECTION INTRAVENOUS at 09:11

## 2023-11-20 RX ADMIN — PERFLUTREN 1.5 ML: 6.52 INJECTION, SUSPENSION INTRAVENOUS at 12:48

## 2023-11-20 RX ADMIN — SODIUM CHLORIDE: 9 INJECTION, SOLUTION INTRAVENOUS at 09:00

## 2023-11-20 RX ADMIN — ACETAMINOPHEN 650 MG: 325 TABLET ORAL at 10:17

## 2023-11-20 ASSESSMENT — PAIN SCALES - GENERAL
PAINLEVEL_OUTOF10: 0
PAINLEVEL_OUTOF10: 1
PAINLEVEL_OUTOF10: 4

## 2023-11-20 ASSESSMENT — PAIN DESCRIPTION - LOCATION: LOCATION: HIP

## 2023-11-20 NOTE — DISCHARGE SUMMARY
[facility] Cardiology Discharge Summary      Patient ID:  Terry Martin  [id number]  33 y.o.  1970    Admit Date: 11/19/2023    Discharge Date: 11/20/2023     Admitting Physician: Daniel Mejia MD     PCP: Coral Perez MD    Discharge Physician: Veto Dominguez MD    Consults: Cardiac Rehab, PT, OT, Case management     Admission Diagnoses: STEMI (ST elevation myocardial infarction) [facility]) [I21.3]    Discharge Diagnoses: Principal Problem:    STEMI (ST elevation myocardial infarction) [facility])  Active Problems:    Chest pain    Hypokalemia    Bipolar 2 disorder ([address])  Resolved Problems:    * No resolved hospital problems. *      Discharge Condition: Good    Cardiology Procedures this Admission:  Diagnostic left heart catheterization  EchoCardiogram    Hospital Course:   Terry Martin presented with CP, radiating to her jaw and left arm. EKG revealed mild ST elevation in the septal leads. She was taken urgently to the cath lab. Cath did not reveal any critical stenosis, STEMI ruled out. She did have mod disease to her LAD and mild disease to her Left cx. TTE was reviewed by Dr. Cassie Garduno. BP unable to tolerate any therapy. She was stable for d/c home and will remain on ASA/plavix and statin therapy. The patient was seen by cardiac rehab for education. Case mangement was consulted for discharge planning. The patient/family was kept apprised of the patient's disease process and treatment plan of care. After receiving maximum benefit from in-patient hospitalization, the patient was ready for discharge. At the time of discharge he was up ambulating and hemodynamically stable. Please see final progress note for further details. ECHO No results found for this or any previous visit. Cardiac cath 11/19/23    CARDIAC PROCEDURE 11/19/2023  3:21 PM (Final)    Conclusion  Findings:  1) Normal LVEDP, normal LVEF  2) Moderate to severe 1 vessel coronary artery disease involving LAD with MARLENE III flow.  Mild

## 2023-11-20 NOTE — CARDIO/PULMONARY
[facility] Cardiac Rehab  Events of this hospitalizaton noted. Chart review completed. STEMI diagnosis was ruled out by catheterization (Dr. Tammy Coto note). Therefore pt is not a candidate for the outpatient cardiac rehab program.  N.  [employer]

## 2023-11-20 NOTE — CARE COORDINATION
11/20/2023  2:15 PM  Pt emergently admitted 11/19/23 for STEMI, followed by cardiology s/p C 11/19,   pt recently had R Hip repair for capsule tear, PT eval, pt has no skilled needs at DC and owns all required DME.   plan for outpatient follow up   There are no CM DC needs identified  Pt's spouse is bedside and will transport home  Pamula Dakins

## 2023-11-22 ENCOUNTER — OFFICE VISIT (OUTPATIENT)
Age: 53
End: 2023-11-22
Payer: COMMERCIAL

## 2023-11-22 ENCOUNTER — TELEPHONE (OUTPATIENT)
Age: 53
End: 2023-11-22

## 2023-11-22 VITALS
BODY MASS INDEX: 23.3 KG/M2 | DIASTOLIC BLOOD PRESSURE: 80 MMHG | SYSTOLIC BLOOD PRESSURE: 122 MMHG | OXYGEN SATURATION: 99 % | HEIGHT: 66 IN | HEART RATE: 74 BPM | WEIGHT: 145 LBS

## 2023-11-22 DIAGNOSIS — R07.9 CHEST PAIN, UNSPECIFIED TYPE: ICD-10-CM

## 2023-11-22 DIAGNOSIS — I21.3 ST ELEVATION MYOCARDIAL INFARCTION (STEMI), UNSPECIFIED ARTERY (HCC): Primary | ICD-10-CM

## 2023-11-22 DIAGNOSIS — E78.5 HYPERLIPIDEMIA, UNSPECIFIED HYPERLIPIDEMIA TYPE: ICD-10-CM

## 2023-11-22 PROCEDURE — 93000 ELECTROCARDIOGRAM COMPLETE: CPT | Performed by: INTERNAL MEDICINE

## 2023-11-22 PROCEDURE — 99205 OFFICE O/P NEW HI 60 MIN: CPT | Performed by: INTERNAL MEDICINE

## 2023-11-22 RX ORDER — MELOXICAM 7.5 MG/1
TABLET ORAL
COMMUNITY
Start: 2023-10-31

## 2023-11-22 NOTE — PROGRESS NOTES
Patient: Adam Cole  : 1970    Primary Cardiologist:   TAI Cardiologist:  PCP: Jayda Das MD    Today's Date: 2023      ASSESSMENT AND PLAN:     Assessment and Plan:  Hospital Follow Up/CAD  Chest pain syndrome - ? Embolic event vs microvascular dz vs arrhythmia  Moderate LAD stenosis  ASA/statin/clopidogrel  Lab Results   Component Value Date    LDLCALC 158.2 (H) 2023   14 day holter to look for occult arrythmia  Likely will pursue functional study in future    2. Post op labrum    3. FH CAD    Follow up 6 weeks. ICD-10-CM    1. ST elevation myocardial infarction (STEMI), unspecified artery (HCC)  I21.3 EKG 12 Lead      2. Chest pain, unspecified type  R07.9       3. Hyperlipidemia, unspecified hyperlipidemia type  E78.5           HISTORY OF PRESENT ILLNESS:     History of Present Illness:  Adam Cole is a 47 y.o. female who presents for hospital FU. Recent admission [facility]. Patient is a 69-year-old female with history of hypertension, hyperlipidemia, bipolar disorder s/p recent hip surgery who presented to the ED with chest pain-underwent LHC which showed nonobstructive CAD, intermediate LAD lesion    Tore labrum in Fall  surgery.  sharp  pain in left chest to left side. Pretty intense. In her neck, jaw. Left arm when numb. Vision blurred. Pale, clammy. Called 911. Hooked up leads, acute MI. Given fentanyl, nitro SL. Pain stopped by the time she got to hospital.  Triaged as STEMI? Cath Dr. Greco  - no obstructive CAD, moderate to severe LAD lesion, not intervened on. Given fentanyl, nitro SL. Pain stopped by the time she got to hospital.      Hs trp 1300 (post cath)    .      ++ FH CAD dad. EKG  in ED NSR, NO STEMI. DO not have EMS strips to review. Denies chest pain, edema, syncope, shortness of breath at rest, dyspnea on exertion, PND or orthopnea. Has no tachycardia, palpitations or sense of arrhythmia.

## 2023-11-22 NOTE — TELEPHONE ENCOUNTER
Enrolled with [employer] - Ordered and being shipped to patient's home address on file. ETA within 5-7 business days. 14 day monitor  Received: Today  PIPPA Shepherd Jami  14 day monitor per Dr. Adam Howell for chest pain and palpitations.

## 2023-11-22 NOTE — PROGRESS NOTES
Chief Complaint   Patient presents with    Follow-Up from Hospital    Coronary Artery Disease    Other     STEMI     Vitals:    11/22/23 0957   BP: 122/80   Site: Left Upper Arm   Position: Sitting   Pulse: 74   SpO2: 99%   Weight: 65.8 kg (145 lb)   Height: 1.676 m (5' 6\")       Chest pain: DENIED     SOB: VERY EXTREMELY TIRED ALL THE TIME      Palpitations: DENIED     Swelling in hands/feet: DENIED     Dizziness: DENIED     Recent hospital stays: DENIED     Refills: DENIED

## 2024-01-17 ENCOUNTER — OFFICE VISIT (OUTPATIENT)
Age: 54
End: 2024-01-17
Payer: COMMERCIAL

## 2024-01-17 VITALS
SYSTOLIC BLOOD PRESSURE: 122 MMHG | BODY MASS INDEX: 23.27 KG/M2 | DIASTOLIC BLOOD PRESSURE: 84 MMHG | HEART RATE: 77 BPM | OXYGEN SATURATION: 99 % | HEIGHT: 66 IN | WEIGHT: 144.8 LBS

## 2024-01-17 DIAGNOSIS — E78.5 HYPERLIPIDEMIA, UNSPECIFIED HYPERLIPIDEMIA TYPE: ICD-10-CM

## 2024-01-17 DIAGNOSIS — R00.2 PALPITATIONS: ICD-10-CM

## 2024-01-17 DIAGNOSIS — R07.9 CHEST PAIN, UNSPECIFIED TYPE: Primary | ICD-10-CM

## 2024-01-17 PROCEDURE — 99214 OFFICE O/P EST MOD 30 MIN: CPT | Performed by: INTERNAL MEDICINE

## 2024-01-17 RX ORDER — ROSUVASTATIN CALCIUM 5 MG/1
5 TABLET, COATED ORAL DAILY
Qty: 90 TABLET | Refills: 3 | Status: SHIPPED | OUTPATIENT
Start: 2024-01-17

## 2024-01-17 NOTE — PROGRESS NOTES
Chief Complaint   Patient presents with    Coronary Artery Disease    Hyperlipidemia    Other     STEMI     Vitals:    01/17/24 1117   BP: 122/84   Site: Left Upper Arm   Position: Sitting   Pulse: 77   SpO2: 99%   Weight: 65.7 kg (144 lb 12.8 oz)   Height: 1.676 m (5' 6\")     Chest pain: DENIED     Recent hospital stays: DENIED     Refills: DENIED

## 2024-01-17 NOTE — PROGRESS NOTES
Patient: Loraine Mendiola  : 1970    Primary Cardiologist: Marleen Culp MD  EP Cardiologist:  PCP: Sergio Ron MD    Today's Date: 2024      ASSESSMENT AND PLAN:     Assessment and Plan:  Hospital Follow Up/CAD  Chest pain syndrome - ? Embolic event vs microvascular dz vs arrhythmia  Moderate LAD stenosis cath 2023  ASA/statin/clopidogrel initiated  Lab Results   Component Value Date    LDLCALC 158.2 (H) 2023   14 day holter to look for occult arrhythmia NSR rare PAC, PVC  Has developed GI Se from meds  Decided to stop clopidogrel, continue ASA 81, change statin from atorva to rosuvastatin  Needs repeat lipids    2. Post op labrum    3. FH CAD    Follow up 8-10 months.      ICD-10-CM    1. Chest pain, unspecified type  R07.9       2. Hyperlipidemia, unspecified hyperlipidemia type  E78.5 rosuvastatin (CRESTOR) 5 MG tablet      3. Palpitations  R00.2             HISTORY OF PRESENT ILLNESS:     History of Present Illness:  Loraine Mendiola is a 53 y.o. female who presents for hospital FU.      Recent admission [facility].    Patient is a 53-year-old female with history of hypertension, hyperlipidemia, bipolar disorder s/p recent hip surgery who presented to the ED with chest pain-underwent LHC which showed nonobstructive CAD, intermediate LAD lesion    Tore labrum in Fall  surgery.   sharp  pain in left chest to left side. Pretty intense.  In her neck, jaw.  Left arm when numb.  Vision blurred.  Pale, clammy.  Called 911.  Hooked up leads, acute MI.  Given fentanyl, nitro SL.  Pain stopped by the time she got to hospital.  Triaged as STEMI?    Cath Dr. LAIRD  - no obstructive CAD, moderate to severe LAD lesion, not intervened on.      Given fentanyl, nitro SL.  Pain stopped by the time she got to hospital.      Hs trp 1300 (post cath)    .      ++ FH CAD dad.      EKG  in ED NSR, NO STEMI.  DO not have EMS strips to review.      Denies chest pain, edema, syncope,

## 2024-05-01 ENCOUNTER — CLINICAL DOCUMENTATION (OUTPATIENT)
Age: 54
End: 2024-05-01

## 2024-05-01 NOTE — PROGRESS NOTES
Late entry- 4/17/24 [employer] colonscopy clearance form completed by Lisseth Barber NP and faxed to the number on the form

## 2024-05-02 ENCOUNTER — HOSPITAL ENCOUNTER (OUTPATIENT)
Facility: HOSPITAL | Age: 54
Discharge: HOME | End: 2024-05-02
Payer: COMMERCIAL

## 2024-05-02 DIAGNOSIS — R07.89 ATYPICAL CHEST PAIN: ICD-10-CM

## 2024-05-02 PROCEDURE — 74248 X-RAY SM INT F-THRU STD: CPT

## 2024-09-16 ENCOUNTER — OFFICE VISIT (OUTPATIENT)
Age: 54
End: 2024-09-16
Payer: COMMERCIAL

## 2024-09-16 ENCOUNTER — PATIENT MESSAGE (OUTPATIENT)
Age: 54
End: 2024-09-16

## 2024-09-16 VITALS
WEIGHT: 164.2 LBS | HEART RATE: 80 BPM | BODY MASS INDEX: 26.39 KG/M2 | HEIGHT: 66 IN | SYSTOLIC BLOOD PRESSURE: 122 MMHG | OXYGEN SATURATION: 98 % | DIASTOLIC BLOOD PRESSURE: 74 MMHG

## 2024-09-16 DIAGNOSIS — R00.2 PALPITATIONS: ICD-10-CM

## 2024-09-16 DIAGNOSIS — R00.2 PALPITATIONS: Primary | ICD-10-CM

## 2024-09-16 DIAGNOSIS — E78.5 HYPERLIPIDEMIA, UNSPECIFIED HYPERLIPIDEMIA TYPE: ICD-10-CM

## 2024-09-16 DIAGNOSIS — I25.10 CORONARY ARTERY DISEASE INVOLVING NATIVE CORONARY ARTERY OF NATIVE HEART WITHOUT ANGINA PECTORIS: ICD-10-CM

## 2024-09-16 PROCEDURE — 99214 OFFICE O/P EST MOD 30 MIN: CPT | Performed by: INTERNAL MEDICINE

## 2024-09-16 RX ORDER — EZETIMIBE 10 MG/1
10 TABLET ORAL DAILY
Qty: 90 TABLET | Refills: 3 | Status: SHIPPED | OUTPATIENT
Start: 2024-09-16

## 2024-09-22 LAB — LPA SERPL-SCNC: 26.1 NMOL/L

## 2024-10-12 DIAGNOSIS — E78.5 HYPERLIPIDEMIA, UNSPECIFIED HYPERLIPIDEMIA TYPE: ICD-10-CM

## 2024-10-14 RX ORDER — ROSUVASTATIN CALCIUM 5 MG/1
5 TABLET, COATED ORAL DAILY
Qty: 90 TABLET | Refills: 3 | Status: SHIPPED | OUTPATIENT
Start: 2024-10-14

## 2024-10-14 NOTE — TELEPHONE ENCOUNTER
Per verbal order from Dr. Ed Meza  Last appt: 9/16/2024     Future Appointments   Date Time Provider Department Center   3/17/2025 10:40 AM Ed Meza, MD [facility]       Requested Prescriptions     Signed Prescriptions Disp Refills    rosuvastatin (CRESTOR) 5 MG tablet 90 tablet 3     Sig: TAKE 1 TABLET BY MOUTH EVERY DAY     Authorizing Provider: ED MEZA     Ordering User: YAMILET MCDOWELL

## 2024-11-12 ENCOUNTER — HOSPITAL ENCOUNTER (OUTPATIENT)
Facility: HOSPITAL | Age: 54
Discharge: HOME | End: 2024-11-15
Payer: COMMERCIAL

## 2024-11-12 VITALS — WEIGHT: 164 LBS | BODY MASS INDEX: 26.47 KG/M2

## 2024-11-12 DIAGNOSIS — Z12.31 VISIT FOR SCREENING MAMMOGRAM: ICD-10-CM

## 2024-11-12 PROCEDURE — 77063 BREAST TOMOSYNTHESIS BI: CPT

## 2024-11-26 ENCOUNTER — TELEPHONE (OUTPATIENT)
Age: 54
End: 2024-11-26

## 2024-11-26 DIAGNOSIS — R60.9 EDEMA: ICD-10-CM

## 2024-11-26 DIAGNOSIS — M79.605 LEG PAIN, BILATERAL: ICD-10-CM

## 2024-11-26 DIAGNOSIS — M79.604 LEG PAIN, BILATERAL: ICD-10-CM

## 2024-11-26 DIAGNOSIS — I25.10 CORONARY ARTERY DISEASE INVOLVING NATIVE CORONARY ARTERY OF NATIVE HEART WITHOUT ANGINA PECTORIS: Primary | ICD-10-CM

## 2024-11-26 NOTE — TELEPHONE ENCOUNTER
Chica Jaimes, PIPPA  You17 minutes ago (1:03 PM)     SH  If you can call her to explain Dr. Culp recommended a vascular referral and to order CARLA to get the ball rolling. Please schedule CARLA during call. Thank you.

## 2024-11-27 NOTE — TELEPHONE ENCOUNTER
Informed pt about referral to vascular office and need for CARLA study.     Please call pt to schedule CARLA

## 2024-12-03 ENCOUNTER — PATIENT MESSAGE (OUTPATIENT)
Age: 54
End: 2024-12-03

## 2025-01-02 ENCOUNTER — PATIENT MESSAGE (OUTPATIENT)
Age: 55
End: 2025-01-02

## 2025-06-17 NOTE — PROGRESS NOTES
Patient: Loraine Mendiola  : 1970    Primary Cardiologist: Marleen Culp MD   PCP: Lev Franklin DO    Today's Date: 2025      ASSESSMENT AND PLAN:     Assessment and Plan:  CAD  - Chest pain syndrome - ? Embolic event vs microvascular dz vs arrhythmia  - echo 2023 - EF 50-55%   - Moderate LAD stenosis cath 2023, nonobstructive   - side effects of indigestion/gas pains to crestor and lipitor, will check lipid panel now. If LDL not <70 would recommend Repatha. She agrees to try     Constipation - naturopathic doctor - started on mag variant, and a pro mobility agent.     FH CAD  - Lpa normal   14 day holter 23 - average HR 72 bpm, PAC < 1%, PVC <1%   CARLA 2025 - normal     Follow-up with Dr. Culp 6 months.     ICD-10-CM    1. Coronary artery disease involving native coronary artery of native heart without angina pectoris  I25.10 EKG 12 Lead     Lipid Panel     Evolocumab (REPATHA) SOAJ pen      2. Hyperlipidemia, unspecified hyperlipidemia type  E78.5           Labs from PCP 2025 Hgb 14, HCT 42.6, A1c 5.3% TSH normal, , HDL 74, , Na 138, K 4.3, BUN 17, Cr 0.97    HISTORY OF PRESENT ILLNESS:     History of Present Illness:  Loraine Mendiola is a 54 y.o. female who presents for follow-up. Feels well. Denies chest pain, shortness of breath, edema, palpitations.   Side effects to statins.     Tore labrum in Fall  surgery.   sharp  pain in left chest to left side. Pretty intense.  In her neck, jaw.  Left arm when numb.  Vision blurred.  Pale, clammy.  Called 911.  Hooked up leads, acute MI.  Given fentanyl, nitro SL.  Pain stopped by the time she got to hospital.  Triaged as STEMI?    Cath Dr. LAIRD 2023 - no obstructive CAD, moderate to severe LAD lesion, not intervened on.      ++ FH CAD dad.      PAST MEDICAL HISTORY:     Past Medical History:   Diagnosis Date    Bipolar 2 disorder (HCC)        Past Surgical History:   Procedure Laterality Date    CARDIAC

## 2025-06-23 ENCOUNTER — OFFICE VISIT (OUTPATIENT)
Age: 55
End: 2025-06-23
Payer: COMMERCIAL

## 2025-06-23 VITALS
BODY MASS INDEX: 25.88 KG/M2 | SYSTOLIC BLOOD PRESSURE: 128 MMHG | DIASTOLIC BLOOD PRESSURE: 80 MMHG | HEART RATE: 65 BPM | RESPIRATION RATE: 17 BRPM | WEIGHT: 161 LBS | HEIGHT: 66 IN | OXYGEN SATURATION: 98 %

## 2025-06-23 DIAGNOSIS — I25.10 CORONARY ARTERY DISEASE INVOLVING NATIVE CORONARY ARTERY OF NATIVE HEART WITHOUT ANGINA PECTORIS: Primary | ICD-10-CM

## 2025-06-23 DIAGNOSIS — E78.5 HYPERLIPIDEMIA, UNSPECIFIED HYPERLIPIDEMIA TYPE: ICD-10-CM

## 2025-06-23 PROCEDURE — 93000 ELECTROCARDIOGRAM COMPLETE: CPT

## 2025-06-23 PROCEDURE — 99214 OFFICE O/P EST MOD 30 MIN: CPT

## 2025-06-23 RX ORDER — FLIBANSERIN 100 MG/1
100 TABLET, FILM COATED ORAL DAILY
COMMUNITY
Start: 2025-04-07

## 2025-06-23 NOTE — PROGRESS NOTES
had concerns including Coronary Artery Disease and Palpitations.    Vitals:    06/23/25 0934   BP: 128/80   BP Site: Left Upper Arm   Patient Position: Sitting   Pulse: 65   Resp: 17   SpO2: 98%   Weight: 73 kg (161 lb)   Height: 1.676 m (5' 6\")        Chest pain No    Refills No        1. Have you been to the ER, urgent care clinic since your last visit? No       Hospitalized since your last visit? No       Where?        Date?

## 2025-06-27 ENCOUNTER — RESULTS FOLLOW-UP (OUTPATIENT)
Age: 55
End: 2025-06-27

## 2025-06-27 LAB
CHOLEST SERPL-MCNC: 191 MG/DL (ref 100–199)
HDLC SERPL-MCNC: 86 MG/DL
IMP & REVIEW OF LAB RESULTS: NORMAL
LDLC SERPL CALC-MCNC: 96 MG/DL (ref 0–99)
TRIGL SERPL-MCNC: 45 MG/DL (ref 0–149)
VLDLC SERPL CALC-MCNC: 9 MG/DL (ref 5–40)

## (undated) DEVICE — ADMINISTRATION SET 72 IN SINGLE LUER LCK NAMIC

## (undated) DEVICE — DEVICE INFL 20ML 30ATM DGT FLD DISPNS SYR W ACCESSPLUS BLU

## (undated) DEVICE — HEART CATH-SFMC: Brand: MEDLINE INDUSTRIES, INC.

## (undated) DEVICE — CATHETER DIAG 5FR L110CM LUMN ID0.047IN PGTL 6 SIDE H HUB

## (undated) DEVICE — ANGIOGRAPHIC CATHETER: Brand: IMPULSE™

## (undated) DEVICE — Device: Brand: ASAHI SION BLUE

## (undated) DEVICE — SUPPORT WRST AD W3.5XL9IN DIA14.5IN ART SFT ADJ HK AND LOOP

## (undated) DEVICE — TR BAND RADIAL ARTERY COMPRESSION DEVICE: Brand: TR BAND

## (undated) DEVICE — CATHETER GUID 6FR L100CM DIA0.071IN NYL SHFT EBU3.5

## (undated) DEVICE — GLIDESHEATH SLENDER STAINLESS STEEL KIT: Brand: GLIDESHEATH SLENDER

## (undated) DEVICE — TUBING PRSS MON L12IN PVC RIG NONEXPANDING M TO FEM CONN

## (undated) DEVICE — Device

## (undated) DEVICE — COPILOT BLEEDBACK CONTROL VALVE: Brand: COPILOT

## (undated) DEVICE — SYRINGE ANGIO 10ML RED POLYCARB HI PRSS FIX M LUER